# Patient Record
Sex: FEMALE | URBAN - METROPOLITAN AREA
[De-identification: names, ages, dates, MRNs, and addresses within clinical notes are randomized per-mention and may not be internally consistent; named-entity substitution may affect disease eponyms.]

---

## 2017-03-02 ENCOUNTER — OUTPATIENT (OUTPATIENT)
Dept: OUTPATIENT SERVICES | Facility: HOSPITAL | Age: 32
LOS: 1 days | Discharge: HOME | End: 2017-03-02

## 2017-03-07 DIAGNOSIS — M95.0 ACQUIRED DEFORMITY OF NOSE: ICD-10-CM

## 2017-03-07 DIAGNOSIS — E06.3 AUTOIMMUNE THYROIDITIS: ICD-10-CM

## 2024-07-22 ENCOUNTER — APPOINTMENT (OUTPATIENT)
Dept: OTOLARYNGOLOGY | Facility: CLINIC | Age: 39
End: 2024-07-22
Payer: COMMERCIAL

## 2024-07-22 DIAGNOSIS — J34.3 HYPERTROPHY OF NASAL TURBINATES: ICD-10-CM

## 2024-07-22 DIAGNOSIS — Z78.9 OTHER SPECIFIED HEALTH STATUS: ICD-10-CM

## 2024-07-22 DIAGNOSIS — J32.0 CHRONIC MAXILLARY SINUSITIS: ICD-10-CM

## 2024-07-22 DIAGNOSIS — J34.2 DEVIATED NASAL SEPTUM: ICD-10-CM

## 2024-07-22 DIAGNOSIS — Z87.09 PERSONAL HISTORY OF OTHER DISEASES OF THE RESPIRATORY SYSTEM: ICD-10-CM

## 2024-07-22 PROCEDURE — 31231 NASAL ENDOSCOPY DX: CPT

## 2024-07-22 PROCEDURE — 99204 OFFICE O/P NEW MOD 45 MIN: CPT | Mod: 25

## 2024-07-22 RX ORDER — SPIRONOLACTONE AND HYDROCHLOROTHIAZIDE 25; 25 MG/1; MG/1
TABLET, FILM COATED ORAL
Refills: 0 | Status: ACTIVE | COMMUNITY

## 2024-07-22 RX ORDER — ESTRADIOL 0.1 MG/G
CREAM VAGINAL
Refills: 0 | Status: ACTIVE | COMMUNITY

## 2024-07-23 PROBLEM — J32.0 CHRONIC MAXILLARY SINUSITIS: Status: ACTIVE | Noted: 2024-07-23

## 2024-07-23 PROBLEM — J34.2 DNS (DEVIATED NASAL SEPTUM): Status: ACTIVE | Noted: 2024-07-23

## 2024-07-23 PROBLEM — J34.3 HYPERTROPHY, NASAL, TURBINATE: Status: ACTIVE | Noted: 2024-07-23

## 2024-07-23 NOTE — HISTORY OF PRESENT ILLNESS
[de-identified] : Initial visit. Chief complaint is "sinus infections/structural".  She reports that she frequently has congestion in her right nasal airway and pressure in her mid face. She reports that she was having some neck pain had what sounds like an MRI and there was evidence of what she is describing is significant sinusitis.  According to her she was treated and referred to an otolaryngologist.  The otolaryngologist did imaging on April 24, 2024.  I reviewed the imaging and there is a description of inflammatory changes in the paranasal sinuses and ostiomeatal units. I reviewed the film and I do not appreciate clinically significant mucosal inflammation. She does have a large maxillary crest spur abutting the right lateral nasal wall.  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  Once again she is very clear that her symptoms are right-sided.  It is rare for her to be treated with sinus infections.  Furthermore she reports that she has been using Flonase with significant relief. She also reports a history of rhinoplasty in 2017.

## 2024-07-23 NOTE — PROCEDURE
[FreeTextEntry6] : PROCEDURE NOTES   PROCEDURE: Nasal endoscopy SURGEON: Dr. Diamond INDICATIONS: Assess for chronic sinusitis. Verbal consent obtained. ANESTHESIA: The patient was placed in a sitting position.  Following application of the topical anesthetic and decongestant, exam was performed with a zero degree endoscope.  The scope was passed along the right nasal floor to the nasopharynx.  It was then passed into the region of the middle meatus, middle turbinate, and sphenoethmoid region.  An identical procedure was performed on the left side.  The following findings were noted:   The nasal mucosa was healthy appearing and the septum was roughly midline. The middle meatus and sphenoethmoid recesses were clear bilaterally. The Superior meatus was without lesion or infection.  The Inferior, Middle and Superior Turbinates were not enlarged and healthy in appearance.  There was not pus, polyps or mass.  The nasopharynx was normal.   Tolerated the procedure well.    Left middle turbinate is hyperplastic. Deviated septum to the right that abuts the lateral nasal wall.  After decongestant therapy there was not significant improvement.

## 2024-07-23 NOTE — ASSESSMENT
[FreeTextEntry1] : I see no clinical indication for sinus surgery at this time based on her history, exam and CAT scan. She could be considered a candidate for septoplasty and opening of the right ostiomeatal unit. We talked about rhinogenic headaches. Outcomes are variable. I did tell her that in order to proceed I would need to review her operative report from her rhinoplasty in 2017 to see information regarding previous septoplasty. She also mention she may consider revision septoplasty in the future and I told her categorically if that is the case that I would hold off on septoplasty.  I explained that oftentimes septal cartilage use for grafting. I gave her the name of 2 reputable rhinoplasty surgeons.